# Patient Record
Sex: FEMALE | Race: WHITE | URBAN - METROPOLITAN AREA
[De-identification: names, ages, dates, MRNs, and addresses within clinical notes are randomized per-mention and may not be internally consistent; named-entity substitution may affect disease eponyms.]

---

## 2016-08-04 LAB
HBV SURFACE AG SERPL QL IA: NEGATIVE
RUBELLA ABY IGG: NORMAL
RUBELLA ANTIBODY IGG QUANTITATIVE: POSITIVE IU/ML

## 2017-01-25 ENCOUNTER — PRE VISIT (OUTPATIENT)
Dept: MATERNAL FETAL MEDICINE | Facility: CLINIC | Age: 32
End: 2017-01-25

## 2017-01-26 ENCOUNTER — HOSPITAL ENCOUNTER (OUTPATIENT)
Dept: ULTRASOUND IMAGING | Facility: CLINIC | Age: 32
Discharge: HOME | End: 2017-01-26
Attending: OBSTETRICS & GYNECOLOGY | Admitting: OBSTETRICS & GYNECOLOGY
Payer: COMMERCIAL

## 2017-01-26 ENCOUNTER — OFFICE VISIT (OUTPATIENT)
Dept: MATERNAL FETAL MEDICINE | Facility: CLINIC | Age: 32
End: 2017-01-26
Attending: OBSTETRICS & GYNECOLOGY
Payer: COMMERCIAL

## 2017-01-26 DIAGNOSIS — O36.5932 IUGR (INTRAUTERINE GROWTH RESTRICTION) AFFECTING CARE OF MOTHER, THIRD TRIMESTER, FETUS 2: ICD-10-CM

## 2017-01-26 DIAGNOSIS — O30.043 DICHORIONIC DIAMNIOTIC TWIN PREGNANCY IN THIRD TRIMESTER: Primary | ICD-10-CM

## 2017-01-26 DIAGNOSIS — O26.90 PREGNANCY RELATED CONDITION, UNSPECIFIED TRIMESTER: ICD-10-CM

## 2017-01-26 PROCEDURE — 76819 FETAL BIOPHYS PROFIL W/O NST: CPT | Performed by: OBSTETRICS & GYNECOLOGY

## 2017-01-26 PROCEDURE — 76812 OB US DETAILED ADDL FETUS: CPT

## 2017-01-26 NOTE — PROGRESS NOTES
"Please see \"Imaging\" tab under \"Chart Review\" for details of today's US at the [facility].    Kleber Odonnell MD  Maternal-Fetal Medicine      "

## 2017-02-02 ENCOUNTER — SURGERY (OUTPATIENT)
Age: 32
End: 2017-02-02

## 2017-02-02 ENCOUNTER — ANESTHESIA EVENT (OUTPATIENT)
Dept: OBGYN | Facility: CLINIC | Age: 32
End: 2017-02-02
Payer: COMMERCIAL

## 2017-02-02 ENCOUNTER — HOSPITAL ENCOUNTER (INPATIENT)
Facility: CLINIC | Age: 32
LOS: 3 days | Discharge: HOME HEALTH | End: 2017-02-05
Attending: OBSTETRICS & GYNECOLOGY | Admitting: OBSTETRICS & GYNECOLOGY
Payer: COMMERCIAL

## 2017-02-02 ENCOUNTER — ANESTHESIA (OUTPATIENT)
Dept: OBGYN | Facility: CLINIC | Age: 32
End: 2017-02-02
Payer: COMMERCIAL

## 2017-02-02 ENCOUNTER — TRANSFERRED RECORDS (OUTPATIENT)
Dept: HEALTH INFORMATION MANAGEMENT | Facility: CLINIC | Age: 32
End: 2017-02-02

## 2017-02-02 DIAGNOSIS — Z98.891 S/P CESAREAN SECTION: Primary | ICD-10-CM

## 2017-02-02 PROBLEM — Z48.89 ENCOUNTER FOR POSTOPERATIVE CARE: Status: ACTIVE | Noted: 2017-02-02

## 2017-02-02 PROBLEM — O36.5990 IUGR (INTRAUTERINE GROWTH RESTRICTION) AFFECTING CARE OF MOTHER: Status: ACTIVE | Noted: 2017-02-02

## 2017-02-02 LAB
ABO + RH BLD: NORMAL
ABO + RH BLD: NORMAL
BASOPHILS # BLD AUTO: 0 10E9/L (ref 0–0.2)
BASOPHILS NFR BLD AUTO: 0.3 %
BLD GP AB SCN SERPL QL: NORMAL
BLOOD BANK CMNT PATIENT-IMP: NORMAL
DIFFERENTIAL METHOD BLD: NORMAL
EOSINOPHIL # BLD AUTO: 0.1 10E9/L (ref 0–0.7)
EOSINOPHIL NFR BLD AUTO: 1 %
ERYTHROCYTE [DISTWIDTH] IN BLOOD BY AUTOMATED COUNT: 13.9 % (ref 10–15)
HCT VFR BLD AUTO: 38 % (ref 35–47)
HGB BLD-MCNC: 12.5 G/DL (ref 11.7–15.7)
IMM GRANULOCYTES # BLD: 0 10E9/L (ref 0–0.4)
IMM GRANULOCYTES NFR BLD: 0.4 %
LYMPHOCYTES # BLD AUTO: 1.4 10E9/L (ref 0.8–5.3)
LYMPHOCYTES NFR BLD AUTO: 19.1 %
MCH RBC QN AUTO: 31.8 PG (ref 26.5–33)
MCHC RBC AUTO-ENTMCNC: 32.9 G/DL (ref 31.5–36.5)
MCV RBC AUTO: 97 FL (ref 78–100)
MONOCYTES # BLD AUTO: 0.5 10E9/L (ref 0–1.3)
MONOCYTES NFR BLD AUTO: 6.9 %
NEUTROPHILS # BLD AUTO: 5.1 10E9/L (ref 1.6–8.3)
NEUTROPHILS NFR BLD AUTO: 72.3 %
NRBC # BLD AUTO: 0 10*3/UL
NRBC BLD AUTO-RTO: 0 /100
PLATELET # BLD AUTO: 165 10E9/L (ref 150–450)
RBC # BLD AUTO: 3.93 10E12/L (ref 3.8–5.2)
SPECIMEN EXP DATE BLD: NORMAL
WBC # BLD AUTO: 7.1 10E9/L (ref 4–11)

## 2017-02-02 PROCEDURE — 99215 OFFICE O/P EST HI 40 MIN: CPT

## 2017-02-02 PROCEDURE — 12000030 ZZH R&B OB INTERMEDIATE UMMC

## 2017-02-02 PROCEDURE — C1765 ADHESION BARRIER: HCPCS | Performed by: OBSTETRICS & GYNECOLOGY

## 2017-02-02 PROCEDURE — 59025 FETAL NON-STRESS TEST: CPT

## 2017-02-02 PROCEDURE — 86900 BLOOD TYPING SEROLOGIC ABO: CPT | Performed by: OBSTETRICS & GYNECOLOGY

## 2017-02-02 PROCEDURE — 85025 COMPLETE CBC W/AUTO DIFF WBC: CPT | Performed by: OBSTETRICS & GYNECOLOGY

## 2017-02-02 PROCEDURE — 59025 FETAL NON-STRESS TEST: CPT | Mod: 59

## 2017-02-02 PROCEDURE — 36000057 ZZH SURGERY LEVEL 3 1ST 30 MIN - UMMC: Performed by: OBSTETRICS & GYNECOLOGY

## 2017-02-02 PROCEDURE — 86780 TREPONEMA PALLIDUM: CPT | Performed by: OBSTETRICS & GYNECOLOGY

## 2017-02-02 PROCEDURE — 86850 RBC ANTIBODY SCREEN: CPT | Performed by: OBSTETRICS & GYNECOLOGY

## 2017-02-02 PROCEDURE — 27210794 ZZH OR GENERAL SUPPLY STERILE: Performed by: OBSTETRICS & GYNECOLOGY

## 2017-02-02 PROCEDURE — 37000009 ZZH ANESTHESIA TECHNICAL FEE, EACH ADDTL 15 MIN: Performed by: OBSTETRICS & GYNECOLOGY

## 2017-02-02 PROCEDURE — 25800025 ZZH RX 258: Performed by: ANESTHESIOLOGY

## 2017-02-02 PROCEDURE — 25000132 ZZH RX MED GY IP 250 OP 250 PS 637

## 2017-02-02 PROCEDURE — 25000128 H RX IP 250 OP 636: Performed by: ANESTHESIOLOGY

## 2017-02-02 PROCEDURE — 88307 TISSUE EXAM BY PATHOLOGIST: CPT | Mod: 26 | Performed by: OBSTETRICS & GYNECOLOGY

## 2017-02-02 PROCEDURE — 88307 TISSUE EXAM BY PATHOLOGIST: CPT | Performed by: OBSTETRICS & GYNECOLOGY

## 2017-02-02 PROCEDURE — 86901 BLOOD TYPING SEROLOGIC RH(D): CPT | Performed by: OBSTETRICS & GYNECOLOGY

## 2017-02-02 PROCEDURE — 25000125 ZZHC RX 250: Performed by: ANESTHESIOLOGY

## 2017-02-02 PROCEDURE — C9290 INJ, BUPIVACAINE LIPOSOME: HCPCS | Performed by: ANESTHESIOLOGY

## 2017-02-02 PROCEDURE — 71000015 ZZH RECOVERY PHASE 1 LEVEL 2 EA ADDTL HR: Performed by: OBSTETRICS & GYNECOLOGY

## 2017-02-02 PROCEDURE — 71000014 ZZH RECOVERY PHASE 1 LEVEL 2 FIRST HR: Performed by: OBSTETRICS & GYNECOLOGY

## 2017-02-02 PROCEDURE — 37000008 ZZH ANESTHESIA TECHNICAL FEE, 1ST 30 MIN: Performed by: OBSTETRICS & GYNECOLOGY

## 2017-02-02 PROCEDURE — 71000027 ZZH RECOVERY PHASE 2 EACH 15 MINS: Performed by: OBSTETRICS & GYNECOLOGY

## 2017-02-02 PROCEDURE — 27110028 ZZH OR GENERAL SUPPLY NON-STERILE: Performed by: OBSTETRICS & GYNECOLOGY

## 2017-02-02 PROCEDURE — 36000059 ZZH SURGERY LEVEL 3 EA 15 ADDTL MIN UMMC: Performed by: OBSTETRICS & GYNECOLOGY

## 2017-02-02 PROCEDURE — 40000170 ZZH STATISTIC PRE-PROCEDURE ASSESSMENT II: Performed by: OBSTETRICS & GYNECOLOGY

## 2017-02-02 PROCEDURE — 25800025 ZZH RX 258: Performed by: OBSTETRICS & GYNECOLOGY

## 2017-02-02 RX ORDER — LABETALOL HYDROCHLORIDE 5 MG/ML
10 INJECTION, SOLUTION INTRAVENOUS
Status: CANCELLED | OUTPATIENT
Start: 2017-02-02

## 2017-02-02 RX ORDER — OXYTOCIN/0.9 % SODIUM CHLORIDE 30/500 ML
100 PLASTIC BAG, INJECTION (ML) INTRAVENOUS CONTINUOUS
Status: DISCONTINUED | OUTPATIENT
Start: 2017-02-02 | End: 2017-02-05 | Stop reason: HOSPADM

## 2017-02-02 RX ORDER — DIPHENHYDRAMINE HCL 25 MG
25 CAPSULE ORAL EVERY 6 HOURS PRN
Status: DISCONTINUED | OUTPATIENT
Start: 2017-02-02 | End: 2017-02-05 | Stop reason: HOSPADM

## 2017-02-02 RX ORDER — CEFAZOLIN SODIUM 1 G/3ML
INJECTION, POWDER, FOR SOLUTION INTRAMUSCULAR; INTRAVENOUS PRN
Status: DISCONTINUED | OUTPATIENT
Start: 2017-02-02 | End: 2017-02-02

## 2017-02-02 RX ORDER — NALOXONE HYDROCHLORIDE 0.4 MG/ML
.1-.4 INJECTION, SOLUTION INTRAMUSCULAR; INTRAVENOUS; SUBCUTANEOUS
Status: DISCONTINUED | OUTPATIENT
Start: 2017-02-02 | End: 2017-02-05 | Stop reason: HOSPADM

## 2017-02-02 RX ORDER — ONDANSETRON 2 MG/ML
4 INJECTION INTRAMUSCULAR; INTRAVENOUS EVERY 6 HOURS PRN
Status: DISCONTINUED | OUTPATIENT
Start: 2017-02-02 | End: 2017-02-03

## 2017-02-02 RX ORDER — HYDROCORTISONE 2.5 %
CREAM (GRAM) TOPICAL 3 TIMES DAILY PRN
Status: DISCONTINUED | OUTPATIENT
Start: 2017-02-02 | End: 2017-02-05 | Stop reason: HOSPADM

## 2017-02-02 RX ORDER — EPHEDRINE SULFATE 50 MG/ML
INJECTION, SOLUTION INTRAMUSCULAR; INTRAVENOUS; SUBCUTANEOUS PRN
Status: DISCONTINUED | OUTPATIENT
Start: 2017-02-02 | End: 2017-02-02

## 2017-02-02 RX ORDER — LIDOCAINE 40 MG/G
CREAM TOPICAL
Status: DISCONTINUED | OUTPATIENT
Start: 2017-02-02 | End: 2017-02-05 | Stop reason: HOSPADM

## 2017-02-02 RX ORDER — ONDANSETRON 2 MG/ML
4 INJECTION INTRAMUSCULAR; INTRAVENOUS EVERY 30 MIN PRN
Status: CANCELLED | OUTPATIENT
Start: 2017-02-02

## 2017-02-02 RX ORDER — ACETAMINOPHEN 325 MG/1
650 TABLET ORAL EVERY 4 HOURS PRN
Status: DISCONTINUED | OUTPATIENT
Start: 2017-02-05 | End: 2017-02-05 | Stop reason: HOSPADM

## 2017-02-02 RX ORDER — AMOXICILLIN 250 MG
1-2 CAPSULE ORAL 2 TIMES DAILY
Status: DISCONTINUED | OUTPATIENT
Start: 2017-02-02 | End: 2017-02-05 | Stop reason: HOSPADM

## 2017-02-02 RX ORDER — CEFAZOLIN SODIUM 1 G/3ML
1 INJECTION, POWDER, FOR SOLUTION INTRAMUSCULAR; INTRAVENOUS
Status: DISCONTINUED | OUTPATIENT
Start: 2017-02-02 | End: 2017-02-02 | Stop reason: HOSPADM

## 2017-02-02 RX ORDER — ONDANSETRON 2 MG/ML
4 INJECTION INTRAMUSCULAR; INTRAVENOUS EVERY 6 HOURS PRN
Status: DISCONTINUED | OUTPATIENT
Start: 2017-02-02 | End: 2017-02-05 | Stop reason: HOSPADM

## 2017-02-02 RX ORDER — SIMETHICONE 80 MG
80 TABLET,CHEWABLE ORAL 4 TIMES DAILY PRN
Status: DISCONTINUED | OUTPATIENT
Start: 2017-02-02 | End: 2017-02-05 | Stop reason: HOSPADM

## 2017-02-02 RX ORDER — DEXTROSE, SODIUM CHLORIDE, SODIUM LACTATE, POTASSIUM CHLORIDE, AND CALCIUM CHLORIDE 5; .6; .31; .03; .02 G/100ML; G/100ML; G/100ML; G/100ML; G/100ML
INJECTION, SOLUTION INTRAVENOUS CONTINUOUS
Status: DISCONTINUED | OUTPATIENT
Start: 2017-02-02 | End: 2017-02-05 | Stop reason: HOSPADM

## 2017-02-02 RX ORDER — EPHEDRINE SULFATE 50 MG/ML
5 INJECTION, SOLUTION INTRAMUSCULAR; INTRAVENOUS; SUBCUTANEOUS
Status: DISCONTINUED | OUTPATIENT
Start: 2017-02-02 | End: 2017-02-05 | Stop reason: HOSPADM

## 2017-02-02 RX ORDER — MORPHINE SULFATE 1 MG/ML
0.2 INJECTION, SOLUTION EPIDURAL; INTRATHECAL; INTRAVENOUS ONCE
Status: DISCONTINUED | OUTPATIENT
Start: 2017-02-02 | End: 2017-02-05 | Stop reason: HOSPADM

## 2017-02-02 RX ORDER — BUPIVACAINE HYDROCHLORIDE AND EPINEPHRINE 2.5; 5 MG/ML; UG/ML
INJECTION, SOLUTION INFILTRATION; PERINEURAL PRN
Status: DISCONTINUED | OUTPATIENT
Start: 2017-02-02 | End: 2017-02-02

## 2017-02-02 RX ORDER — KETOROLAC TROMETHAMINE 30 MG/ML
15 INJECTION, SOLUTION INTRAMUSCULAR; INTRAVENOUS EVERY 6 HOURS
Status: DISPENSED | OUTPATIENT
Start: 2017-02-02 | End: 2017-02-03

## 2017-02-02 RX ORDER — ONDANSETRON 4 MG/1
4 TABLET, ORALLY DISINTEGRATING ORAL EVERY 6 HOURS PRN
Status: DISCONTINUED | OUTPATIENT
Start: 2017-02-02 | End: 2017-02-05 | Stop reason: HOSPADM

## 2017-02-02 RX ORDER — CEFAZOLIN SODIUM 2 G/100ML
2 INJECTION, SOLUTION INTRAVENOUS
Status: DISCONTINUED | OUTPATIENT
Start: 2017-02-02 | End: 2017-02-02 | Stop reason: HOSPADM

## 2017-02-02 RX ORDER — BISACODYL 10 MG
10 SUPPOSITORY, RECTAL RECTAL DAILY PRN
Status: DISCONTINUED | OUTPATIENT
Start: 2017-02-04 | End: 2017-02-05 | Stop reason: HOSPADM

## 2017-02-02 RX ORDER — HYDRALAZINE HYDROCHLORIDE 20 MG/ML
2.5-5 INJECTION INTRAMUSCULAR; INTRAVENOUS EVERY 10 MIN PRN
Status: CANCELLED | OUTPATIENT
Start: 2017-02-02

## 2017-02-02 RX ORDER — OXYTOCIN/0.9 % SODIUM CHLORIDE 30/500 ML
340 PLASTIC BAG, INJECTION (ML) INTRAVENOUS CONTINUOUS PRN
Status: DISCONTINUED | OUTPATIENT
Start: 2017-02-02 | End: 2017-02-05 | Stop reason: HOSPADM

## 2017-02-02 RX ORDER — LANOLIN 100 %
OINTMENT (GRAM) TOPICAL
Status: DISCONTINUED | OUTPATIENT
Start: 2017-02-02 | End: 2017-02-05 | Stop reason: HOSPADM

## 2017-02-02 RX ORDER — MISOPROSTOL 200 UG/1
400 TABLET ORAL
Status: DISCONTINUED | OUTPATIENT
Start: 2017-02-02 | End: 2017-02-05 | Stop reason: HOSPADM

## 2017-02-02 RX ORDER — FENTANYL CITRATE 50 UG/ML
INJECTION, SOLUTION INTRAMUSCULAR; INTRAVENOUS PRN
Status: DISCONTINUED | OUTPATIENT
Start: 2017-02-02 | End: 2017-02-02

## 2017-02-02 RX ORDER — PRENATAL VIT/IRON FUM/FOLIC AC 27MG-0.8MG
1 TABLET ORAL DAILY
COMMUNITY

## 2017-02-02 RX ORDER — KETOROLAC TROMETHAMINE 30 MG/ML
INJECTION, SOLUTION INTRAMUSCULAR; INTRAVENOUS PRN
Status: DISCONTINUED | OUTPATIENT
Start: 2017-02-02 | End: 2017-02-02

## 2017-02-02 RX ORDER — SODIUM CHLORIDE, SODIUM LACTATE, POTASSIUM CHLORIDE, CALCIUM CHLORIDE 600; 310; 30; 20 MG/100ML; MG/100ML; MG/100ML; MG/100ML
INJECTION, SOLUTION INTRAVENOUS CONTINUOUS
Status: DISCONTINUED | OUTPATIENT
Start: 2017-02-02 | End: 2017-02-02 | Stop reason: HOSPADM

## 2017-02-02 RX ORDER — IBUPROFEN 400 MG/1
400-800 TABLET, FILM COATED ORAL EVERY 6 HOURS PRN
Status: DISCONTINUED | OUTPATIENT
Start: 2017-02-02 | End: 2017-02-05 | Stop reason: HOSPADM

## 2017-02-02 RX ORDER — ACETAMINOPHEN 325 MG/1
975 TABLET ORAL EVERY 8 HOURS
Status: DISCONTINUED | OUTPATIENT
Start: 2017-02-02 | End: 2017-02-05 | Stop reason: HOSPADM

## 2017-02-02 RX ORDER — BUPIVACAINE HYDROCHLORIDE 7.5 MG/ML
INJECTION, SOLUTION INTRASPINAL PRN
Status: DISCONTINUED | OUTPATIENT
Start: 2017-02-02 | End: 2017-02-02

## 2017-02-02 RX ORDER — HYDROMORPHONE HYDROCHLORIDE 1 MG/ML
.3-.5 INJECTION, SOLUTION INTRAMUSCULAR; INTRAVENOUS; SUBCUTANEOUS EVERY 30 MIN PRN
Status: DISCONTINUED | OUTPATIENT
Start: 2017-02-02 | End: 2017-02-05 | Stop reason: HOSPADM

## 2017-02-02 RX ORDER — DIPHENHYDRAMINE HYDROCHLORIDE 50 MG/ML
25 INJECTION INTRAMUSCULAR; INTRAVENOUS EVERY 6 HOURS PRN
Status: DISCONTINUED | OUTPATIENT
Start: 2017-02-02 | End: 2017-02-05 | Stop reason: HOSPADM

## 2017-02-02 RX ORDER — LIDOCAINE 40 MG/G
CREAM TOPICAL
Status: DISCONTINUED | OUTPATIENT
Start: 2017-02-02 | End: 2017-02-03

## 2017-02-02 RX ORDER — MORPHINE SULFATE 1 MG/ML
INJECTION, SOLUTION EPIDURAL; INTRATHECAL; INTRAVENOUS PRN
Status: DISCONTINUED | OUTPATIENT
Start: 2017-02-02 | End: 2017-02-02

## 2017-02-02 RX ORDER — OXYCODONE HYDROCHLORIDE 5 MG/1
5-10 TABLET ORAL
Status: DISCONTINUED | OUTPATIENT
Start: 2017-02-02 | End: 2017-02-05 | Stop reason: HOSPADM

## 2017-02-02 RX ORDER — ONDANSETRON 4 MG/1
4 TABLET, ORALLY DISINTEGRATING ORAL EVERY 30 MIN PRN
Status: CANCELLED | OUTPATIENT
Start: 2017-02-02

## 2017-02-02 RX ORDER — FENTANYL CITRATE 50 UG/ML
15 INJECTION, SOLUTION INTRAMUSCULAR; INTRAVENOUS ONCE
Status: DISCONTINUED | OUTPATIENT
Start: 2017-02-02 | End: 2017-02-05 | Stop reason: HOSPADM

## 2017-02-02 RX ORDER — SODIUM CHLORIDE, SODIUM LACTATE, POTASSIUM CHLORIDE, CALCIUM CHLORIDE 600; 310; 30; 20 MG/100ML; MG/100ML; MG/100ML; MG/100ML
INJECTION, SOLUTION INTRAVENOUS CONTINUOUS
Status: DISCONTINUED | OUTPATIENT
Start: 2017-02-02 | End: 2017-02-05 | Stop reason: HOSPADM

## 2017-02-02 RX ORDER — NALBUPHINE HYDROCHLORIDE 10 MG/ML
2.5-5 INJECTION, SOLUTION INTRAMUSCULAR; INTRAVENOUS; SUBCUTANEOUS EVERY 6 HOURS PRN
Status: DISCONTINUED | OUTPATIENT
Start: 2017-02-02 | End: 2017-02-05 | Stop reason: HOSPADM

## 2017-02-02 RX ORDER — ALBUTEROL SULFATE 0.83 MG/ML
2.5 SOLUTION RESPIRATORY (INHALATION) EVERY 4 HOURS PRN
Status: CANCELLED | OUTPATIENT
Start: 2017-02-02

## 2017-02-02 RX ORDER — ONDANSETRON 2 MG/ML
INJECTION INTRAMUSCULAR; INTRAVENOUS PRN
Status: DISCONTINUED | OUTPATIENT
Start: 2017-02-02 | End: 2017-02-02

## 2017-02-02 RX ORDER — NALOXONE HYDROCHLORIDE 0.4 MG/ML
.1-.4 INJECTION, SOLUTION INTRAMUSCULAR; INTRAVENOUS; SUBCUTANEOUS
Status: DISCONTINUED | OUTPATIENT
Start: 2017-02-02 | End: 2017-02-03

## 2017-02-02 RX ORDER — SODIUM CHLORIDE, SODIUM LACTATE, POTASSIUM CHLORIDE, CALCIUM CHLORIDE 600; 310; 30; 20 MG/100ML; MG/100ML; MG/100ML; MG/100ML
INJECTION, SOLUTION INTRAVENOUS CONTINUOUS PRN
Status: DISCONTINUED | OUTPATIENT
Start: 2017-02-02 | End: 2017-02-02

## 2017-02-02 RX ORDER — OXYTOCIN 10 [USP'U]/ML
10 INJECTION, SOLUTION INTRAMUSCULAR; INTRAVENOUS
Status: DISCONTINUED | OUTPATIENT
Start: 2017-02-02 | End: 2017-02-05 | Stop reason: HOSPADM

## 2017-02-02 RX ADMIN — Medication 0.3 MCG/KG/MIN: at 19:02

## 2017-02-02 RX ADMIN — SODIUM CHLORIDE, POTASSIUM CHLORIDE, SODIUM LACTATE AND CALCIUM CHLORIDE: 600; 310; 30; 20 INJECTION, SOLUTION INTRAVENOUS at 17:08

## 2017-02-02 RX ADMIN — ONDANSETRON 4 MG: 2 INJECTION INTRAMUSCULAR; INTRAVENOUS at 19:37

## 2017-02-02 RX ADMIN — PHENYLEPHRINE HYDROCHLORIDE 200 MCG: 10 INJECTION, SOLUTION INTRAMUSCULAR; INTRAVENOUS; SUBCUTANEOUS at 19:06

## 2017-02-02 RX ADMIN — Medication 5 MG: at 19:09

## 2017-02-02 RX ADMIN — SODIUM CHLORIDE, POTASSIUM CHLORIDE, SODIUM LACTATE AND CALCIUM CHLORIDE: 600; 310; 30; 20 INJECTION, SOLUTION INTRAVENOUS at 15:59

## 2017-02-02 RX ADMIN — BUPIVACAINE HYDROCHLORIDE IN DEXTROSE 1.6 ML: 7.5 INJECTION, SOLUTION SUBARACHNOID at 19:04

## 2017-02-02 RX ADMIN — OXYTOCIN-SODIUM CHLORIDE 0.9% IV SOLN 30 UNIT/500ML 36 UNITS/HR: 30-0.9/5 SOLUTION at 19:32

## 2017-02-02 RX ADMIN — Medication 5 MG: at 19:17

## 2017-02-02 RX ADMIN — CEFAZOLIN 2 G: 1 INJECTION, POWDER, FOR SOLUTION INTRAMUSCULAR; INTRAVENOUS at 19:18

## 2017-02-02 RX ADMIN — KETOROLAC TROMETHAMINE 30 MG: 30 INJECTION, SOLUTION INTRAMUSCULAR at 19:51

## 2017-02-02 RX ADMIN — BUPIVACAINE HYDROCHLORIDE AND EPINEPHRINE BITARTRATE 20 ML: 2.5; .005 INJECTION, SOLUTION INFILTRATION; PERINEURAL at 20:42

## 2017-02-02 RX ADMIN — SODIUM CHLORIDE, POTASSIUM CHLORIDE, SODIUM LACTATE AND CALCIUM CHLORIDE: 600; 310; 30; 20 INJECTION, SOLUTION INTRAVENOUS at 18:43

## 2017-02-02 RX ADMIN — BUPIVACAINE 20 ML: 13.3 INJECTION, SUSPENSION, LIPOSOMAL INFILTRATION at 20:42

## 2017-02-02 RX ADMIN — FENTANYL CITRATE 15 MCG: 50 INJECTION, SOLUTION INTRAMUSCULAR; INTRAVENOUS at 19:04

## 2017-02-02 RX ADMIN — MORPHINE SULFATE 0.2 MG: 1 INJECTION EPIDURAL; INTRATHECAL; INTRAVENOUS at 19:04

## 2017-02-02 RX ADMIN — SODIUM CITRATE AND CITRIC ACID MONOHYDRATE 30 ML: 500; 334 SOLUTION ORAL at 18:38

## 2017-02-02 NOTE — IP AVS SNAPSHOT
[facility]    [address] [facility] [address]    Phone:  [phone]                                       After Visit Summary   2/2/2017    Alvin Hills    MRN: [id number]           After Visit Summary Signature Page     I have received my discharge instructions, and my questions have been answered. I have discussed any challenges I see with this plan with the nurse or doctor.    ..........................................................................................................................................  Patient/Patient Representative Signature      ..........................................................................................................................................  Patient Representative Print Name and Relationship to Patient    ..................................................               ................................................  Date                                            Time    ..........................................................................................................................................  Reviewed by Signature/Title    ...................................................              ..............................................  Date                                                            Time

## 2017-02-02 NOTE — PLAN OF CARE
Pt sent from [facility] with new finding of abnormal u/s specific to dopplers of baby B. Pt denies health concerns. First pregnancy. Knows A-boy B-girl. Vss. Afebrile. Denies bleeding, LOF, cramping or ctx. +FM x2. Takes prenatal, denies allergies. Waiting to obtain records from [facility]. Oriented to room, call light in reach, pt bill of rights given. Family present at bedside. IV access obtained. Plan for provider to eval and determine plan.

## 2017-02-02 NOTE — H&P
Antepartum History and Physical   2017  Alvin Hills  [id number]      HPI: Alvin Hills is a 31 year old  with a Di-Di twin gestation at 34w5d by IVF c/w 10w0d US who presents to Antepartum as a transfer of care of [facility] in [place]. She is transferred here because today she had a BPP w/Doppler today which showed intermittent reversal of EDF for baby 2. She is s/p BMZ  and .     She states that she is feeling well today.  She denies headache, vision changes, chest pain, shortness of breath, fever, chills, nausea, vomiting or other systemic complaints. She denies vaginal bleeding or loss of fluid and is feeling normal fetal movement.    ROS:  No headaches, vision changes, nausea, vomiting, fevers, chills, chest pain, SOB, abdominal pain, constipation, diarrhea, dysuria, changes in vaginal discharge or edema in extremities noted.     Her pregnancy has been complicated by:  -Intrauterine growth restriction for fetus 2 (8th percentile) 32% discordance    OBHX:   Obstetric History       T0      TAB0   SAB0   E0   M0   L0       # Outcome Date GA Lbr Kj/2nd Weight Sex Delivery Anes PTL Lv   1 Current                 MedicalHX:   Past Medical History   Diagnosis Date     Cardiac abnormality      murmur     SurgicalHX:   [place] Teeth removed at age 20    Medications:     No current facility-administered medications on file prior to encounter.  No current outpatient prescriptions on file prior to encounter.    Allergies:  No Known Allergies    FamilyHX:  No family history on file.    SocialHX:   Social History     Social History     Marital Status: N/A     Spouse Name: N/A     Number of Children: N/A     Years of Education: N/A     Social History Main Topics     Smoking status: None     Smokeless tobacco: None     Alcohol Use: None     Drug Use: None     Sexual Activity: Not Asked     Other Topics Concern     None     Social History Narrative       ROS: 10-point ROS negative except  "as indicated in HPI.    Physical Exam:  Filed Vitals:    02/02/17 1440   BP: 136/82   Temp: 98.4  F (36.9  C)   TempSrc: Oral   Resp: 16   Height: 1.626 m (5' 4\")   Weight: 79.379 kg (175 lb)     General: alert, oriented female, resting in bed in NAD  CV: regular rate and rhythm, normal s1 and s2, soft 2/6 systolic murmur appreciated  Lungs: clear bilaterally, no crackles or wheezes  Abdomen: soft, gravid, non-tender  Extremities: bilateral lower extremities non-tender with 1+ edema    Presentation: Baby 1 Cephalic, Baby 2 Breech by OHS US    FHT Baby 1: baseline 135, moderate variability, +accelerations, no decelerations  FHT Baby 2: baseline 135 moderate variability, +accelerations, no decelerations  [place]: Occasional contractions    Prenatal ultrasounds:  [facility] US Comprehensive Twins on 1/26  IMPRESSION  ---------------------------------------------------------------------------------------------------------  For fetus 1: Growth parameters and estimated fetal weight were consistent with appropriate for gestational age pattern of growth. Fetal anatomy appeared normal for  gestational age. Normal SARWAT. BPP is reassuring.     For fetus 2:. Growth parameters and estimated fetal weight were consistent with asymmetric intrauterine growth restriction. Fetal anatomy appeared normal for gestational  age. Normal SARWAT. BPP is reassuring. There is increased resistance in the umbilical artery.    [facility] BPP 2/2  Impression:  1. There is a twin, living intrauterine pregnancy  2. Twin A is in the cephalic position. Twin A BPP is 8/8  3. Twin B is in the breech position. Twin B BPP is 8/8  4. The S/D ratio and resistive index for Twin B is abnormal. Additionally, on one of the tracings there is brief reverse diastolic flow of the umbilical cord Doppler for Twin B.    Labs:      Lab Results   Component Value Date    ABO Pending 02/02/2017    RH Pending 02/02/2017    AS Pending 02/02/2017    HGB 12.5 02/02/2017     GBS Status: "   No results found for: GBS    Assessment: 31 year old  Di-Di Twin getstation at 34w5d by IVF, confirmed with 10w0d US, here for IUGR for fetus 2 with intermittent reversal in the UA Doppler concerning for uteroplacental insufficiency. BMZ course within the past week.     Plan:  - Pt is at 34w5d, and given new REDF on Doppler in the setting of IUGR, recommend delivery today for fetal well being.  - Risks and benefits of C/S vs Trial of Induction discussed she consented to proceed w/ C/S due to the suspected significant uteroplacental insufficieny and malpresentation of fetus 2.  - Type and Screen, Anti Treponema, and CBC obtained.   - NICU consult placed.  - Pt is s/p BMZ  and , no indication for further dosing.  - Anesthesia consult placed, Pt has been NPO since 830.    Fetal well-being  - Category 1 FHT  - BPPs reassuring    Will SALLY Melara MS3, acting as a scribe for Dr. Betancourt.    This note, as scribed, accurately reflects the examination, my impressions and plan as discussed with the patient.    Germaine Betancourt MD  Specialist in Maternal-Fetal Medicine

## 2017-02-02 NOTE — ANESTHESIA PREPROCEDURE EVALUATION
Anesthesia Evaluation     . Pt has not had prior anesthetic       ROS/MED HX    ENT/Pulmonary:  - neg pulmonary ROS     Neurologic:  - neg neurologic ROS     Cardiovascular: Comment: History of flow murmur        METS/Exercise Tolerance:  >4 METS   Hematologic:  - neg hematologic  ROS       Musculoskeletal:  - neg musculoskeletal ROS       GI/Hepatic:  - neg GI/hepatic ROS       Renal/Genitourinary:  - ROS Renal section negative       Endo:  - neg endo ROS       Psychiatric:  - neg psychiatric ROS       Infectious Disease:  - neg infectious disease ROS       Malignancy:      - no malignancy   Other:               Physical Exam  Normal systems: pulmonary and dental    Airway   Mallampati: I  TM distance: >3 FB  Neck ROM: full    Dental     Cardiovascular   Rhythm and rate: regular and normal      Pulmonary                     Anesthesia Plan      History & Physical Review  History and physical reviewed and following examination; no interval change.    ASA Status:  2 .    NPO Status:  > 8 hours (Patient pregnant)    Plan for Spinal   PONV prophylaxis:  Ondansetron (or other 5HT-3)  31 year old female, , at 34 weeks 5 days with di-di twins. Baby 2 found to have intermittent reversal of EDF and breech presentation now planning to undergo C section.   -T&S  -Antibiotics per surgery  -Consented for TAP blocks for post operative pain control  -Plan for spinal    -Relevant risks, benefits, alternatives and the anesthetic plan were discussed with patient/family or family representative. All questions were answered and there was agreement to proceed.    Stephanie Gray MD  Anesthesiology Resident CA-2        Postoperative Care  Postoperative pain management:  Peripheral nerve block (Single Shot) and Neuraxial analgesia.      Consents  Anesthetic plan, risks, benefits and alternatives discussed with:  Patient.  Use of blood products discussed: Yes.   Use of blood products discussed with Patient.  Consented to blood  products.  .              Patient seen and evaluated. Agree with plan above. Patient in agreement with plan of care, all questions answered.    Otherwise very healthy 32yo here d/t one of her twins having reverse EDF. Also mal-presentation.     -- SSS with duramorph   -- TAP block with Exparel for post-op pain control     Wayne Alvarado MD  Staff Anesthesiologist

## 2017-02-02 NOTE — IP AVS SNAPSHOT
MRN:[id number]                      After Visit Summary   2/2/2017    Alvin Hills    MRN: [id number]           Thank you!     Thank you for choosing [facility] for your care. Our goal is always to provide you with excellent care. Hearing back from our patients is one way we can continue to improve our services. Please take a few minutes to complete the written survey that you may receive in the mail after you visit with us. Thank you!        Patient Information     Date Of Birth          1985        About your hospital stay     You were [facility] on:  February 2[facility] 2017 [facility]    You were discharged on:  February 5, 2017        Reason for your hospital stay       You were in the hospital for delivery of your baby!                  Who to Call     For medical emergencies, please call 911.  For non-urgent questions about your medical care, please call your primary care provider or clinic, [phone]  For questions related to your surgery, please call your surgery clinic        Attending Provider     Provider    Germaine Betancourt MD Hoffman, Diana Yang MD       Primary Care Provider Office Phone # Fax #    Renate FISH Lomas [phone]       F[facility] MED  [address] [facility] [address]         When to contact your care team       Call your doctor if you are having:  Fever >100.4  Chest pain  Shortness of breath  Severe abdominal pain  Nausea/vomiting                  After Care Instructions     Activity       Review discharge instructions            Diet       Resume previous diet            Discharge Instructions - Postpartum visit       Schedule postpartum visit with your provider and return to clinic in 6 weeks.                  Follow-up Appointments     [facility]ult [facility] Follow-up and recommended labs and tests       Follow up in clinic for 6 weeks for postpartum visit    Appointments on [place] and/or [place] (with [facility] or [facility] provider or service). Call [phone] if you haven't heard regarding these appointments within 7 days of discharge.                  Further instructions from your care team       Postop  Birth Instructions    Activity       Do not lift more than 10 pounds for 6 weeks after surgery.  Ask family and friends for help when you need it.    No driving until you have stopped taking your pain medications (usually two weeks after surgery).    No heavy exercise or activity for 6 weeks.  Don't do anything that will put a strain on your surgery site.    Don't strain when using the toilet.  Your care team may prescribe a stool softener if you have problems with your bowel movements.     To care for your incision:       Keep the incision clean and dry.    Do not soak your incision in water. No swimming or hot tubs until it has fully healed. You may soak in the bathtub if the water level is below your incision.    Do not use peroxide, gel, cream, lotion, or ointment on your incision.    Adjust your clothes to avoid pressure on your surgery site (check the elastic in your underwear for example).     You may see a small amount of clear or pink drainage and this is normal.  Check with your health care provider:       If the drainage increases or has an odor.    If the incision reddens, you have swelling, or develop a rash.    If you have increased pain and the medicine we prescribed doesn't help.    If you have a fever above 100.4 F (38 C) with or without chills when placing thermometer under your tongue.   The area around your incision (surgery wound), will feel numb.  This is normal. The numbness should go away in less than a year.     Keep your hands clean:  Always wash your hands before touching your incision (surgery wound). This helps reduce your risk of infection. If your hands aren't dirty, you may use an alcohol hand-rub to clean your hands. Keep your nails clean and short.    Call your  "healthcare provider if you have any of these symptoms:       You soak a sanitary pad with blood within 1 hour, or you see blood clots larger than a golf ball.    Bleeding that lasts more than 6 weeks.    Vaginal discharge that smells bad.    Severe pain, cramping or tenderness in your lower belly area.    A need to urinate more frequently (use the toilet more often), more urgently (use the toilet very quickly), or it burns when you urinate.    Nausea and vomiting.    Redness, swelling or pain around a vein in your leg.    Problems breastfeeding or a red or painful area on your breast.    Chest pain and cough or are gasping for air.    Problems with coping with sadness, anxiety or depression. If you have concerns about hurting yourself or the baby, call your provider immediately.      You have questions or concerns after you return home.                  Pending Results     Date and Time Order Name Status Description    2/2/2017 2105 Placenta path order and indications In process             Statement of Approval     Ordered          02/05/17 1036  I have reviewed and agree with all the recommendations and orders detailed in this document.   EFFECTIVE NOW     Approved and electronically signed by:  Ellen Chew MD             Admission Information        Provider Department Dept Phone    2/2/2017 Diana Grayson MD [facility] [phone]      Your Vitals Were     Blood Pressure Temperature Respirations    125/87 mmHg 97.9  F (36.6  C) (Oral) 20    Height Weight BMI (Body Mass Index)    1.626 m (5' 4\") 79.379 kg (175 lb) 30.02 kg/m2    Pulse Oximetry          99%        MyChart Information     [employer] lets you send messages to your doctor, view your test results, renew your prescriptions, schedule appointments and more. To sign up, go to [email or web address] . Click on \"Log in\" on the left side of the screen, which will take you to the Welcome page. Then click on \"Sign up Now\" on the right side " of the page.     You will be asked to enter the access code listed below, as well as some personal information. Please follow the directions to create your username and password.     Your access code is: [email or web address]  Expires: 2017 11:13 AM     Your access code will  in 90 days. If you need help or a new code, please call your [facility] clinic or [phone].        Care EveryWhere ID     This is your Care EveryWhere ID. This could be used by other organizations to access your [facility] medical records  [id number]           Review of your medicines      START taking        Dose / Directions    acetaminophen 325 MG tablet   Commonly known as:  TYLENOL   Used for:  S/P  section        Dose:  975 mg   Take 3 tablets (975 mg) by mouth every 8 hours   Quantity:  60 tablet   Refills:  1       ibuprofen 400 MG tablet   Commonly known as:  ADVIL/MOTRIN   Used for:  S/P  section        Dose:  400-800 mg   Take 1-2 tablets (400-800 mg) by mouth every 6 hours as needed for other (cramping)   Quantity:  60 tablet   Refills:  1       oxyCODONE 5 MG IR tablet   Commonly known as:  ROXICODONE   Used for:  S/P  section        Dose:  5-10 mg   Take 1-2 tablets (5-10 mg) by mouth every 3 hours as needed for moderate to severe pain   Quantity:  30 tablet   Refills:  0       senna-docusate 8.6-50 MG per tablet   Commonly known as:  SENOKOT-S;PERICOLACE   Used for:  S/P  section        Dose:  1-2 tablet   Take 1-2 tablets by mouth 2 times daily   Quantity:  60 tablet   Refills:  1         CONTINUE these medicines which have NOT CHANGED        Dose / Directions    prenatal multivitamin  plus iron 27-0.8 MG Tabs per tablet   Indication:  Pregnancy        Dose:  1 tablet   Take 1 tablet by mouth daily   Refills:  0            Where to get your medicines      These medications were sent to [facility] [place], [place] - [address]  [address] [address]      Phone:  [phone]    - acetaminophen 325 MG tablet  - ibuprofen 400 MG tablet  - senna-docusate 8.6-50 MG per tablet      Some of these will need a paper prescription and others can be bought over the counter. Ask your nurse if you have questions.     Bring a paper prescription for each of these medications    - oxyCODONE 5 MG IR tablet             Protect others around you: Learn how to safely use, store and throw away your medicines at www.disposemymeds.org.             Medication List: This is a list of all your medications and when to take them. Check marks below indicate your daily home schedule. Keep this list as a reference.      Medications           Morning Afternoon Evening Bedtime As Needed    acetaminophen 325 MG tablet   Commonly known as:  TYLENOL   Take 3 tablets (975 mg) by mouth every 8 hours   Last time this was given:  975 mg on 2/5/2017  8:04 AM                                ibuprofen 400 MG tablet   Commonly known as:  ADVIL/MOTRIN   Take 1-2 tablets (400-800 mg) by mouth every 6 hours as needed for other (cramping)   Last time this was given:  800 mg on 2/5/2017  8:04 AM                                oxyCODONE 5 MG IR tablet   Commonly known as:  ROXICODONE   Take 1-2 tablets (5-10 mg) by mouth every 3 hours as needed for moderate to severe pain                                prenatal multivitamin  plus iron 27-0.8 MG Tabs per tablet   Take 1 tablet by mouth daily                                senna-docusate 8.6-50 MG per tablet   Commonly known as:  SENOKOT-S;PERICOLACE   Take 1-2 tablets by mouth 2 times daily   Last time this was given:  1 tablet on 2/4/2017  8:22 AM

## 2017-02-03 LAB
HGB BLD-MCNC: 12.6 G/DL (ref 11.7–15.7)
T PALLIDUM IGG+IGM SER QL: NEGATIVE

## 2017-02-03 PROCEDURE — 85018 HEMOGLOBIN: CPT | Performed by: OBSTETRICS & GYNECOLOGY

## 2017-02-03 PROCEDURE — 12000030 ZZH R&B OB INTERMEDIATE UMMC

## 2017-02-03 PROCEDURE — 25000128 H RX IP 250 OP 636: Performed by: OBSTETRICS & GYNECOLOGY

## 2017-02-03 PROCEDURE — 25800025 ZZH RX 258: Performed by: OBSTETRICS & GYNECOLOGY

## 2017-02-03 PROCEDURE — 25000125 ZZHC RX 250: Performed by: OBSTETRICS & GYNECOLOGY

## 2017-02-03 PROCEDURE — 36415 COLL VENOUS BLD VENIPUNCTURE: CPT | Performed by: OBSTETRICS & GYNECOLOGY

## 2017-02-03 PROCEDURE — 25000132 ZZH RX MED GY IP 250 OP 250 PS 637: Performed by: OBSTETRICS & GYNECOLOGY

## 2017-02-03 RX ADMIN — OXYTOCIN-SODIUM CHLORIDE 0.9% IV SOLN 30 UNIT/500ML 100 ML/HR: 30-0.9/5 SOLUTION at 00:04

## 2017-02-03 RX ADMIN — SENNOSIDES AND DOCUSATE SODIUM 2 TABLET: 8.6; 5 TABLET ORAL at 08:27

## 2017-02-03 RX ADMIN — ACETAMINOPHEN 975 MG: 325 TABLET, FILM COATED ORAL at 00:04

## 2017-02-03 RX ADMIN — KETOROLAC TROMETHAMINE 15 MG: 30 INJECTION, SOLUTION INTRAMUSCULAR at 16:18

## 2017-02-03 RX ADMIN — SIMETHICONE CHEW TAB 80 MG 80 MG: 80 TABLET ORAL at 16:15

## 2017-02-03 RX ADMIN — KETOROLAC TROMETHAMINE 15 MG: 30 INJECTION, SOLUTION INTRAMUSCULAR at 02:55

## 2017-02-03 RX ADMIN — SODIUM CHLORIDE, SODIUM LACTATE, POTASSIUM CHLORIDE, CALCIUM CHLORIDE AND DEXTROSE MONOHYDRATE: 5; 600; 310; 30; 20 INJECTION, SOLUTION INTRAVENOUS at 04:46

## 2017-02-03 RX ADMIN — ACETAMINOPHEN 975 MG: 325 TABLET, FILM COATED ORAL at 16:16

## 2017-02-03 RX ADMIN — IBUPROFEN 800 MG: 400 TABLET ORAL at 22:24

## 2017-02-03 RX ADMIN — ACETAMINOPHEN 975 MG: 325 TABLET, FILM COATED ORAL at 08:27

## 2017-02-03 RX ADMIN — SIMETHICONE CHEW TAB 80 MG 80 MG: 80 TABLET ORAL at 22:25

## 2017-02-03 RX ADMIN — SENNOSIDES AND DOCUSATE SODIUM 1 TABLET: 8.6; 5 TABLET ORAL at 22:25

## 2017-02-03 RX ADMIN — KETOROLAC TROMETHAMINE 15 MG: 30 INJECTION, SOLUTION INTRAMUSCULAR at 09:53

## 2017-02-03 NOTE — DISCHARGE SUMMARY
[facility] Discharge Summary    Alvin Hills MRN# [id number]   Age: 31 year old YOB: 1985     Date of Admission:  2017  Date of Discharge:  2017  Admitting Physician:  Diana Grayson MD  Discharge Physician:  Ellen Chew MD    Admit Dx:   1. IUP at 34w5d  2. Di/di twin pregnancy    3. Intermittent reversal end diastolic flow on baby 2  4. IUGR baby 2, 8%ile    5. IVF pregnancy    Discharge Dx:  - Same as above, s/p primary low transverse  section      Procedures:  - primary low transverse  section with double layer uterine closure via Pfannenstiel incision  - Spinal analgesia    Admit HPI:  31 year old  Di-Di Twin getstation at 34w5d by IVF confirmed with 10w0d US admitted to [facility] service with IUGR for baby 2 with intermittent reversal in the UA Doppler concerning for uteroplacental insufficiency. Given that she had previously received betamethasone for fetal lung maturity, she was recommended to proceed with delivery. Given fetal malpresentation (Baby A cephalic, Baby B breech) and concern for fetal intolerance of labor, she elected to proceed with  delivery. The risks of surgery were discussed including bleeding, infection and injury to surrounding organs and informed consent was signed.   Please see her admit H&P for full details of her PMH, PSH, Meds, Allergies and exam on admit.    Operative Course:  Surgery was uncomplicated. EBL from the delivery was 800. Please see her  Section Operative Note for full details regarding her delivery.    Operative Findings: No intraabdominal or abdominal wall adhesions. Clear fluid with amniotomy of both babies. Baby A, vigorous male infant born vertex, ROT, APGARs 8 and 8. Weight 6lbs 3.8oz. Baby B, vigorous female infant born juliana breech, APGARs 8 and 9. Weight 3 lb 3 oz.  Normal appearing uterus, ovaries and fallopian tubes. Hemostatic surgical site at the end of the case.       Postoperative Course:  Her postoperative course was uncomplicated. On POD#3, she was meeting all of her postpartum goals and deemed stable for discharge. She was voiding without difficulty, tolerating a regular diet without nausea and vomiting, her pain was well controlled on oral pain medicines and her lochia was appropriate. Her hemoglobin prior to delivery was 12.5 and after delivery was 12.6. Her Rh status was positive and Rhogam was not indicated.    Discharge Medications:  Current Discharge Medication List      START taking these medications    Details   oxyCODONE (ROXICODONE) 5 MG IR tablet Take 1-2 tablets (5-10 mg) by mouth every 3 hours as needed for moderate to severe pain  Qty: 30 tablet, Refills: 0    Associated Diagnoses: S/P  section      acetaminophen (TYLENOL) 325 MG tablet Take 3 tablets (975 mg) by mouth every 8 hours  Qty: 60 tablet, Refills: 1    Associated Diagnoses: S/P  section      ibuprofen (ADVIL/MOTRIN) 400 MG tablet Take 1-2 tablets (400-800 mg) by mouth every 6 hours as needed for other (cramping)  Qty: 60 tablet, Refills: 1    Associated Diagnoses: S/P  section      senna-docusate (SENOKOT-S;PERICOLACE) 8.6-50 MG per tablet Take 1-2 tablets by mouth 2 times daily  Qty: 60 tablet, Refills: 1    Associated Diagnoses: S/P  section         CONTINUE these medications which have NOT CHANGED    Details   Prenatal Vit-Fe Fumarate-FA (PRENATAL MULTIVITAMIN  PLUS IRON) 27-0.8 MG TABS per tablet Take 1 tablet by mouth daily               Discharge/Disposition:  Alvin Hills was discharged to home in stable condition with the following instructions/medications:  1) Call for temperature > 100.4, bright red vaginal bleeding >1 pad an hour x 2 hours, foul smelling vaginal discharge, pain not controlled by usual oral pain meds, persistent nausea and vomiting not controlled on medications, drainage or redness from incision site  2) She declines contraception.  3)  For feeding she decided to pump.  4) She was instructed to follow-up with her primary OB in 6 weeks for a routine postpartum visit.  5) Discharge activity:  No heavy lifting >15 lbs or strenuous activity for 6 weeks, pelvic rest for 6 weeks, no driving or operating machinery while on narcotics.    Mona Goldstein MD  OB/GYN Resident PGY3  02/05/2017    The patient was seen and examined by me on the day of discharge.  I have reviewed and agree with the above note.    Ellen Chew MD, FACOG

## 2017-02-03 NOTE — PLAN OF CARE
Problem: Goal Outcome Summary  Goal: Goal Outcome Summary  Outcome: Therapy, progress toward functional goals as expected  6209-1756: VSS. Postpartum check WDL. Incisional pain managed well with Toradol and Tylenol. Complaining of sharp stabbing R shoulder pain; heat pack applied and simethicone given. Reports improvement in pain after interventions. Encourage pt to ambulate more as tolerated. Partner at bedside. Continue cares.

## 2017-02-03 NOTE — ANESTHESIA PROCEDURE NOTES
Spinal/LP Procedure Note    Spinal Block  Staff:     Anesthesiologist:  KRISTINA GUERRA    Resident/CRNA:  OLI REYNOLDS    Spinal/LP performed by resident/CRNA in presence of a teaching physician.    Location: OR  Procedure Start/Stop Times:     patient identified, IV checked, site marked, risks and benefits discussed, informed consent, monitors and equipment checked, pre-op evaluation, at physician/surgeon's request and post-op pain management      Correct Patient: Yes      Correct Position: Yes      Correct Site: Yes      Correct Procedure: Yes      Correct Laterality:  Yes    Site Marked:  Yes  Procedure:     Procedure:  Intrathecal    ASA:  2    Diagnosis:  C section    Position:  Sitting    Sterile Prep: chloraprep, mask, sterile gloves and patient draped      Insertion site:  L3-4    Approach:  Midline    Needle Type:  Bear    Needle gauge (G):  25    Local Skin Infiltration:  1% lidocaine    amount (ml):  5    Needle Length (in):  3    Introducer used: Yes      Attempts:  2    Redirects:  1    CSF:  Clear    Paresthesias:  Resolved    Time injected:  19:04    Peripheral Nerve Block Procedure Note    Staff:     Anesthesiologist:  KRISTINA GUERRA    Resident/CRNA:  OLI REYNOLDS    Block performed by resident/CRNA in the presence of a teaching physician    Location: PACU  Procedure Start/Stop TImes:     patient identified, IV checked, site marked, risks and benefits discussed, informed consent, monitors and equipment checked, pre-op evaluation, at physician/surgeon's request and post-op pain management      Correct Patient: Yes      Correct Position: Yes      Correct Site: Yes      Correct Procedure: Yes      Correct Laterality:  Yes    Site Marked:  Yes  Procedure details:     Procedure:  TAP    ASA:  2    Diagnosis:  C section    Laterality:  Bilateral    Position:  Supine    Sterile Prep: chloraprep, mask and sterile gloves      Needle:  Short bevel    Needle gauge:  21     Needle length (inches):  4    Ultrasound: Yes      Ultrasound used to identify targeted nerve, plexus, or vascular structure and placed a needle adjacent to it      Permanent Image entered into patiient's record      Abnormal pain on injection: No      Blood Aspirated: No      Paresthesias:  No    Bleeding at site: No      Infusion Method:  Single Shot    Complications:  None

## 2017-02-03 NOTE — PROGRESS NOTES
"OB/GYN Daily Postpartum Note, POD#1      S: Ms. Hills is feeling well this morning. She denies any complaints. Her pain is well-controlled on IV and oral pain medications. She tolerating a regular diet without nausea or vomiting. Lochia is appropriate. Planning to bottlefeed.    O:   VS: Patient Vitals for the past 24 hrs:   BP Temp Temp src Heart Rate Resp SpO2 Height Weight   02/03/17 0253 131/90 mmHg - - 76 16 98 % - -   02/03/17 0202 131/84 mmHg - - 74 16 97 % - -   02/03/17 0055 120/86 mmHg 98  F (36.7  C) Oral 64 16 100 % - -   02/02/17 2349 128/79 mmHg 98  F (36.7  C) Oral 62 16 100 % - -   02/02/17 2256 135/83 mmHg 98  F (36.7  C) Oral - 16 - - -   02/02/17 2200 127/79 mmHg 97.5  F (36.4  C) Oral 58 29 100 % - -   02/02/17 2145 125/76 mmHg - - 64 10 100 % - -   02/02/17 2130 119/85 mmHg - - 67 15 100 % - -   02/02/17 2115 120/86 mmHg - - 61 18 100 % - -   02/02/17 2100 124/76 mmHg - - 65 13 100 % - -   02/02/17 2045 112/76 mmHg - - 64 15 99 % - -   02/02/17 2030 115/76 mmHg - - 50 15 98 % - -   02/02/17 2015 112/70 mmHg 97.5  F (36.4  C) Oral 57 13 99 % - -   02/02/17 2010 111/72 mmHg - - 59 20 - - -   02/02/17 1440 136/82 mmHg 98.4  F (36.9  C) Oral - 16 - 1.626 m (5' 4\") 79.379 kg (175 lb)   General: resting in bed, in NAD  CV: regular rate   Resp: breathing comfortably on room air   Abdomen: soft, appropriately tender, nondistended  Fundus firm at the umbilicus  LE: non-tender, non-edematous, SCDs in place       Recent Labs  Lab 02/02/17  1505   HGB 12.5       A: Ms. Hills is a 31 year old now , POD #1 s/p PLTCS for di/di twin pregnancy with IUGR and abnormal dopplers of Baby B. Uncomplicated delivery.     P:  Continue routine pp cares  Heme 12.5 >  > am hgb pending,   GI: tolerating regular diet  Feeding: bottlefeeding   Contraception: IVF pregnancy   Rh positive, Rubella Immune, GBS unknown  Disposition: routine PP cares, anticipate d/c POD#3 likely, once discharge goals are " obtained    Mackenzie Arriaza MD  OBGYN Resident, PGY3    Staff MD Note    Attempted to see x 2 at morning rounds this morning, patient currently with baby in NICU.  Will continue to attempt to see throughout the day today.    Teri Grover MD

## 2017-02-03 NOTE — PLAN OF CARE
"Problem: Goal Outcome Summary  Goal: Goal Outcome Summary  Outcome: Improving  Data: Vital signs within normal limits. Postpartum checks within normal limits - see flow record. Patient is tolerating a clear liquid diet and denying nausea. Producing adequate urine output, davis removed, saline locked. Patient performing self cares and is \"feeling much better.\"  Action: Patient stated she was comfortable with pain management provided (taps block, toradol, tylenol). Patient education done about the importance of emptying bladder and self care throughout the day.  Response: Support persons (fob, ) present, maternal mother present and both very supportive.   Excited to visit newborns in NICU.   No concerns at this time.         "

## 2017-02-03 NOTE — OR NURSING
Pt to PACU via cart.  VSS, temp 97.5, 20 units of Pitocin infusing by gravity to piv without complications, davis to gravity with clear colored urine. denies pain and nausea, dressing CDI.  I)IV to pump, compression to pneumoboots re-started.  A) Stable P) pt to inform RN if she experiences pain or nausea.  Post op cares.   Anticipate transfer to 5 floor post partum at ~1.5 hours post op.

## 2017-02-03 NOTE — PLAN OF CARE
Problem: Perioperative Period (Adult)  Goal: Signs and Symptoms of Listed Potential Problems Will be Absent or Manageable (Perioperative Period)  Signs and symptoms of listed potential problems will be absent or manageable by discharge/transition of care (reference Perioperative Period (Adult) CPG).  Outcome: Completed Date Met:  02/02/17  C/S viable male at 1929 and female at 1931.

## 2017-02-03 NOTE — CONSULTS
Neonatology Antepartum Counseling Consult    I was asked to provide antepartum counseling for Alvin Hills at the request of Germaine Betancourt MD secondary to planned  delivery of her twins due to intermittent reversed EDF in twin #2 (girl). Ms. Hills is currently 34 5/7 weeks. History also significant for discordant growth amongst the twins. Betamethasone was administered on  & . Ms. Hills, accompanied by FOB, was counseled on the expected hospital course, and outcomes associated with an infant born at approximately 34 weeks gestation. The counseling included: initial delivery room stabilization, respiratory course, lung development, hyperbilirubinemia, thermoregulation support, infection prevention, nutrition, and growth and development.   Please feel free to call with any additional questions or concerns.      EPI Tam, NNP-BC      Nurse Practitioner Service    Intensive Care Unit  [facility]      Floor Time (min): 5  Face to Face Time (min): 20  Total Time (minutes): 25  More than 50% of my time was spent in direct, face to face, antepartum counseling with the above patient.

## 2017-02-03 NOTE — PLAN OF CARE
Problem: Goal Outcome Summary  Goal: Goal Outcome Summary  Outcome: No Change  Data: Alvin Hills transferred to 7145 to [facility]. Twins in NICU.  Action: Oriented patient to surroundings. Call light within reach.   Response: Patient tolerated transfer and is stable.

## 2017-02-03 NOTE — PLAN OF CARE
Problem: Goal Outcome Summary  Goal: Goal Outcome Summary  Outcome: Improving  C/S Delivery Note  Primary C/S of viable Male and Female with Dr. Grayson, Dr. Arriaza, Diana Long,PETRA in attendance.  NICU team and Nursery RN Coral Osuna present.  Infants with spontaneous cry, to mother's abdomen for 1min delayed cord clamping, dried and stimulated.  Baby A APGAR at 1 minute:  8 and APGAR at 5 minutes:  8. Baby B APGAR at 1 minute:  8 and APGAR at 5 minutes:  9  Placenta delivered with out complication, pitocin initiated by Anesthesia. Incision closed, rhina cares provided.  Mother and baby in stable condition. Babies transferred to the NICU and Mother transferred to PACU for recovery.

## 2017-02-03 NOTE — ANESTHESIA POSTPROCEDURE EVALUATION
Patient: Alvin Hills    Procedure(s):   - Wound Class: I-Clean    Diagnosis:PREGNANT  Diagnosis Additional Information: No value filed.    Anesthesia Type:  Spinal    Note:  Anesthesia Post Evaluation    Patient location during evaluation: OB PACU  Patient participation: Able to fully participate in evaluation  Level of consciousness: awake and alert  Pain management: adequate  Airway patency: patent  Cardiovascular status: acceptable  Respiratory status: acceptable  Hydration status: acceptable  PONV: none     Anesthetic complications: None          Last vitals:  Filed Vitals:    02/02/17 2256 02/02/17 2349 02/03/17 0055   BP: 135/83 128/79 120/86   Temp: 36.7  C (98  F) 36.7  C (98  F) 36.7  C (98  F)   Resp: 16 16 16   SpO2:  100% 100%     Doing well s/p TAP block with Exparel. No complaints.     Electronically Signed By: Wayne Alvarado MD  February 3, 2017  2:06 AM

## 2017-02-03 NOTE — ANESTHESIA CARE TRANSFER NOTE
Patient: Alvin Hills    Procedure(s):   - Wound Class: I-Clean    Diagnosis: PREGNANT  Diagnosis Additional Information: No value filed.    Anesthesia Type:   Spinal     Note:  Airway :Room Air  Patient transferred to:PACU  Comments: Patient transferred to OB PACU breathing spontaneously on RA. HDS in PACU. Report given to RN. Patient is comfortable without nausea.       Vitals: (Last set prior to Anesthesia Care Transfer)    CRNA VITALS  2/2/2017 1932 - 2/2/2017 2017 2/2/2017             Pulse: 57    Ht Rate: 64    SpO2: 97 %    Resp Rate (set): 10                Electronically Signed By: Stephanie Gray MD  February 2, 2017  8:17 PM

## 2017-02-03 NOTE — BRIEF OP NOTE
[facility] GYN Brief Operative Note    Patient Name: Alvin Hills  MRN:[id number]  : 1985  Date of Surgery: 2017   Pre-operative diagnosis:  1. IUP at 34w5d  2. Di/di twin pregnancy   3. Intermittent reversal end diastolic flow on baby 2  4. IUGR baby 2, 8%ile   5. IVF pregnancy    Post-operative diagnosis:  Same, delivered   Procedure:  Primary low transverse  section via Pfannenstiel skin incision with double layer uterine closure     Surgeon:  Dr. Grayson (OB/GYN Attending Staff)    Assistant(s):  Mackenzie Arriaza MD (OB/GYN Resident, PGY3)    Anesthesia:  Spinal anesthesia    Estimated blood loss:  800 mL    Total IV fluids:  (See anesthesia record)   600ml crystaloid    Blood transfusion:  No transfusion was given during surgery    Total urine output:  (See anesthesia record)   150 mL    Drains:  None    Specimens:  Placenta    Findings:  No intraabdominal or abdominal wall adhesions. Clear fluid with amniotomy of both babies. Baby A, vigorous male infant born vertex, ROT, APGARs 8 and 8. Weight 6lbs 3.8oz. Baby B, vigorous female infant born juliana breech, APGARs 8 and 9. Weight pending.  Normal appearing uterus, ovaries and fallopian tubes. Hemostatic surgical site at the end of the case.    Complications:  None    Condition:  Stable, transferred to PACU    Comments:  See accompanying operative report for full details        Mackenzie Arriaza MD   OB/GYN Resident PGY-3  2017

## 2017-02-03 NOTE — OP NOTE
[facility]  Obstetrics Service Operative Report    Surgery Date:  2017  Surgeon(s):    Assistants:  Mackenzie Arriaza, OB/GYN Resident, PGY-3    Preoperative Diagnoses:  1. IUP at 34w5d  2. Di/di twin pregnancy    3. Intermittent reversal end diastolic flow on baby 2  4. IUGR baby 2, 8%ile    5. IVF pregnancy  Postoperative diagnoses: Same, delivered    Procedure performed:  Primary low segment transverse  section via Pfannenstiel incision with double layer uterine closure    Anesthesia:  Spinal with duramorph  Est Blood Loss (mL): 800 mL  Fluid replacement: 600 mL lactated Ringer's.   Specimens: Placenta  Complications: None apparent     Operative findings: No intraabdominal or abdominal wall adhesions. Clear fluid with amniotomy of both babies. Baby A, vigorous male infant born vertex, ROT, APGARs 8 and 8. Weight 6lbs 3.8oz. Baby B, vigorous female infant born juliana breech, APGARs 8 and 9. Weight pending.  Normal appearing uterus, ovaries and fallopian tubes. Hemostatic surgical site at the end of the case.      Indication: 31 year old  Di-Di Twin getstation at 34w5d by IVF confirmed with 10w0d US admitted to [facility] service with IUGR for baby 2 with intermittent reversal in the UA Doppler concerning for uteroplacental insufficiency. Given that she had previously received betamethasone for fetal lung maturity, she was recommended to proceed with delivery. Given fetal malpresentation (Baby A cephalic, Baby B breech) and concern for fetal intolerance of labor, she elected to proceed with  delivery. The risks of surgery were discussed including bleeding, infection and injury to surrounding organs and informed consent was signed.     Procedure details:  After obtaining informed consent the patient was taken to the operating room. She received 2 grams of ancef prior to the skin incision. She was placed in the dorsal supine position with a leftward tilt and prepped  "and draped in the usual sterile fashion. Following test of adequate spinal anesthesia and a \"Time-out\" to verify the correct patient and procedure, the abdomen was entered through a transverse skin incision. The skin incision was made sharply and carried through the subcutaneous tissue to the fascia.  Fascia was incised in the midline and extended laterally with the Gilliam scissors. The superior margin of the fascial incision was grasped with Kocher clamps and dissected from the underlying muscle with blunt dissecton, which was then repeated at the lower margin of the fascial incision.  The muscle was  in the midline. The peritoneum was entered bluntly and the opening extended by digital dissection with care to avoid the bladder. A bladder blade was placed. The lower segment of the uterus was opened sharply in a transverse fashion and extended with digital pressure. Baby A's head was elevated to the level of the hysterotomy and was delivered atraumatically. The cord was doubly clamped and cut and the infant was handed off to the waiting Critical access hospital staff after 1 minute of delayed cord clamping. A segment of the cord was cut and set aside for cord gases if needed. AROM was performed of Baby B's amniotic sac and the breech was brought to the hysterotomy. The entire baby was delivered with traction on the bilateral ASIS. The placentas were delivered with gentle cord traction.The uterus was exteriorized from the abdomen and cleared of all clots and debris. Oxytocin was given through the running IV. With vigorous massage as well as administration of oxytocin, good uterine tone was achieved. The hysterotomy was repaired with 0-vicryl suture in a running locked fashion. A 2nd layer of 0-monocryl was used to imbricate the incision and good hemostasis was achieved.  The posterior cul-de-sac was suctioned and the uterus was returned to the abdomen.  The bilateral pericolic gutters were cleared of all clot and debris. The " hysterotomy was again inspected and found to have no active sites of bleeding. The abdominal wall was examined and found to have no active sites of bleeding. The fascia was closed with a running suture of 0-vicryl. Subcutaneous tissue was irrigated. Areas that were oozing were controlled with cautery. The subcutaneous tissue was less than 3cm in depth and did not require reapproximation. The skin was closed with 4-0 monocryl. The patient tolerated the procedure well and was taken to the recovery room in stable condition. All sponge, needle and instrument counts were correct x2. Dr. Grayson was present for the entire procedure.     Mackenzie Arriaza MD  Ob/Gyn Resident Physician  PGY-3  2/2/2017 6:54 PM    I was present and scrubbed for entire procedure.   Diana Grayson MD

## 2017-02-03 NOTE — PLAN OF CARE
Problem: Goal Outcome Summary  Goal: Goal Outcome Summary  Outcome: Improving  Pt vitals & PP assessments are within normal limits. Ambulating independently & voiding well. Visiting her infants in NICU with family. Will support Pt needs & continue on routine PP cares.

## 2017-02-04 PROCEDURE — 12000030 ZZH R&B OB INTERMEDIATE UMMC

## 2017-02-04 PROCEDURE — 25000132 ZZH RX MED GY IP 250 OP 250 PS 637: Performed by: OBSTETRICS & GYNECOLOGY

## 2017-02-04 RX ADMIN — IBUPROFEN 800 MG: 400 TABLET ORAL at 06:15

## 2017-02-04 RX ADMIN — IBUPROFEN 800 MG: 400 TABLET ORAL at 14:45

## 2017-02-04 RX ADMIN — SENNOSIDES AND DOCUSATE SODIUM 1 TABLET: 8.6; 5 TABLET ORAL at 08:22

## 2017-02-04 RX ADMIN — SIMETHICONE CHEW TAB 80 MG 80 MG: 80 TABLET ORAL at 06:26

## 2017-02-04 RX ADMIN — SIMETHICONE CHEW TAB 80 MG 80 MG: 80 TABLET ORAL at 12:35

## 2017-02-04 RX ADMIN — ACETAMINOPHEN 975 MG: 325 TABLET, FILM COATED ORAL at 08:22

## 2017-02-04 RX ADMIN — ACETAMINOPHEN 975 MG: 325 TABLET, FILM COATED ORAL at 16:40

## 2017-02-04 RX ADMIN — ACETAMINOPHEN 975 MG: 325 TABLET, FILM COATED ORAL at 00:32

## 2017-02-04 NOTE — PLAN OF CARE
Problem: Goal Outcome Summary  Goal: Goal Outcome Summary  Outcome: Improving  VSS. Postpartum check WDL. Incision WDL. Pain managed well with Ibuprofen and Tylenol. Aware that Oxycodone is available if needed. Up ad jeanine. Voiding without difficulty. Passing gas and had BM today. Showered this AM. Visiting babies in NICU.  at bedside and supportive. Encouraged pt to watch [facility] videos. Continue cares.

## 2017-02-04 NOTE — PLAN OF CARE
Problem: Goal Outcome Summary  Goal: Goal Outcome Summary  Outcome: No Change  VSS.  Patient reports she slept well overnight.  Continues to report intermittent right shoulder strap pain, with relief after warm packs and Simethicone.  Reports incisional pain is well-controlled with Ibuprofen and Tylenol.  Up ad jeanine and voiding without difficulty.  Plans to shower this morning and to ambulate in hallway when visiting twins in NICU.

## 2017-02-04 NOTE — PROGRESS NOTES
"S:  Doing well this morning, on her way to the NICU.  Pain is well controlled, tolerating a regular diet, ambulating and voiding without difficulty.      O: /80 mmHg  Temp(Src) 98.5  F (36.9  C) (Oral)  Resp 18  Ht 1.626 m (5' 4\")  Wt 79.379 kg (175 lb)  BMI 30.02 kg/m2  SpO2 99%  Breastfeeding? Unknown     gen-pleasant, NAD  abd-soft, FF, appropriately tender  Inc-c/d/i  extr-NT, tr edema [place]  HEMOGLOBIN   Date Value Ref Range Status   02/03/2017 12.6 11.7 - 15.7 g/dL Final     A:  POD #2 s/p primary c/s for di/di twins 2/2 twin B with IUGR, doing well.    P:  Continue routine post-op care.  Reviewed likely discharge to a boarding room or home tomorrow.    Ellen Chew MD, FACOG        "

## 2017-02-05 VITALS
WEIGHT: 175 LBS | HEIGHT: 64 IN | SYSTOLIC BLOOD PRESSURE: 125 MMHG | TEMPERATURE: 97.9 F | OXYGEN SATURATION: 99 % | BODY MASS INDEX: 29.88 KG/M2 | RESPIRATION RATE: 20 BRPM | DIASTOLIC BLOOD PRESSURE: 87 MMHG

## 2017-02-05 PROCEDURE — 25000132 ZZH RX MED GY IP 250 OP 250 PS 637: Performed by: OBSTETRICS & GYNECOLOGY

## 2017-02-05 RX ORDER — OXYCODONE HYDROCHLORIDE 5 MG/1
5-10 TABLET ORAL
Qty: 30 TABLET | Refills: 0 | Status: SHIPPED | OUTPATIENT
Start: 2017-02-05

## 2017-02-05 RX ORDER — AMOXICILLIN 250 MG
1-2 CAPSULE ORAL 2 TIMES DAILY
Qty: 60 TABLET | Refills: 1 | Status: SHIPPED
Start: 2017-02-05

## 2017-02-05 RX ORDER — ACETAMINOPHEN 325 MG/1
975 TABLET ORAL EVERY 8 HOURS
Qty: 60 TABLET | Refills: 1 | Status: SHIPPED
Start: 2017-02-05

## 2017-02-05 RX ORDER — IBUPROFEN 400 MG/1
400-800 TABLET, FILM COATED ORAL EVERY 6 HOURS PRN
Qty: 60 TABLET | Refills: 1 | Status: SHIPPED
Start: 2017-02-05

## 2017-02-05 RX ADMIN — IBUPROFEN 800 MG: 400 TABLET ORAL at 08:04

## 2017-02-05 RX ADMIN — ACETAMINOPHEN 975 MG: 325 TABLET, FILM COATED ORAL at 00:18

## 2017-02-05 RX ADMIN — ACETAMINOPHEN 975 MG: 325 TABLET, FILM COATED ORAL at 08:04

## 2017-02-05 NOTE — PLAN OF CARE
Problem: Goal Outcome Summary  Goal: Goal Outcome Summary  Outcome: Improving  Data: Vital signs within normal limits. Postpartum checks within normal limits - see flow record. Patient eating and drinking normally. Patient able to empty bladder independently and is up ambulating. Incision healing well. Patient performing self cares and is able to care for infant.  Action: Patient medicated during the shift for incisional tenderness. See MAR. Patient reassessed within 1 hour after each medication and pain was improved - patient stated she was comfortable.  No concerns at this time.

## 2017-02-05 NOTE — PLAN OF CARE
Problem: Goal Outcome Summary  Goal: Goal Outcome Summary  Outcome: Adequate for Discharge Date Met:  02/05/17  Patient taking care of self independently. Postpartum checks wnl. Incision WDL with steri strips in placed. Pain managed well with oral medications. Pt educated on discharge instructions and given written handout, verbalized understanding of instructions. Discharged with medications, patient educated on medications and given written copies as well.  Patient discharged to home.

## 2017-02-05 NOTE — PROGRESS NOTES
"S:  Doing well this morning, pain is controlled, tolerating a regular diet, +flatus.  Babies are stable in the NICU.  She would like to go home for a few days and return to a boarding room when the babies are closer to discharge.     O: /87 mmHg  Temp(Src) 97.9  F (36.6  C) (Oral)  Resp 20  Ht 1.626 m (5' 4\")  Wt 79.379 kg (175 lb)  BMI 30.02 kg/m2  SpO2 99%  Breastfeeding? Unknown  gen-pleasant, NAD  abd-soft, FF, appropriately tender  Inc-c/d/i  extr-NT, tr edema  HEMOGLOBIN   Date Value Ref Range Status   02/03/2017 12.6 11.7 - 15.7 g/dL Final     A:  POD #3 s/p primary c/s for twins at 34+ weeks 2/2 twin B with IUGR and abnormal dopplers.    P:  Discharge home today. She plans to follow up with her primary OB for her postpartum visit.  She declines contraception at this time, plans to discuss with her primary OB.     Ellen Chew MD, FACOG    "

## 2017-02-10 LAB — COPATH REPORT: NORMAL

## (undated) DEVICE — SUCTION CANISTER MEDIVAC LINER 1500ML W/LID 65651-515

## (undated) DEVICE — GLOVE PROTEXIS BLUE W/NEU-THERA 7.0  2D73EB70

## (undated) DEVICE — SU MONOCRYL 4-0 PS-2 18" UND Y496G

## (undated) DEVICE — GLOVE ESTEEM POWDER FREE SMT 6.5  2D72PT65

## (undated) DEVICE — BASIN SET MAJOR

## (undated) DEVICE — CATH TRAY FOLEY 16FR SILICONE 907416

## (undated) DEVICE — DRAPE IOBAN C-SECTION W/POUCH 30X35" 6657

## (undated) DEVICE — SOL WATER IRRIG 1000ML BOTTLE 07139-09

## (undated) DEVICE — BNDG ABDOMINAL BINDER 10X26-50" 08140145

## (undated) DEVICE — PREP CHLORAPREP 26ML TINTED ORANGE  260815

## (undated) DEVICE — DRSG ABDOMINAL 07 1/2X8" 7197D

## (undated) DEVICE — BARRIER SEPRAFILM 5X6" SINGLE SHEET 4301-02

## (undated) DEVICE — DRSG STERI STRIP 1/4X3" R1541

## (undated) DEVICE — ESU GROUND PAD UNIVERSAL W/O CORD

## (undated) DEVICE — SU VICRYL 3-0 CTX 36" UND J980H

## (undated) DEVICE — DRAPE SETUP C-SECTION INVISISHIELD 54X90" DYNJE5600

## (undated) DEVICE — SU MONOCRYL 0 CTB-1 36" YB946

## (undated) DEVICE — SOL ADH LIQUID BENZOIN SWAB 0.6ML C1544

## (undated) DEVICE — STOCKING SLEEVE COMPRESSION CALF LG

## (undated) DEVICE — PACK C-SECTION LF PL15OTA83B

## (undated) DEVICE — SU VICRYL 0 CT-1 36" J346H

## (undated) DEVICE — SOL NACL 0.9% IRRIG 1000ML BOTTLE 07138-09

## (undated) DEVICE — STRAP KNEE/BODY 31143004